# Patient Record
Sex: FEMALE | Race: BLACK OR AFRICAN AMERICAN | NOT HISPANIC OR LATINO | URBAN - NONMETROPOLITAN AREA
[De-identification: names, ages, dates, MRNs, and addresses within clinical notes are randomized per-mention and may not be internally consistent; named-entity substitution may affect disease eponyms.]

---

## 2021-06-22 ENCOUNTER — APPOINTMENT (OUTPATIENT)
Dept: URBAN - NONMETROPOLITAN AREA CLINIC 45 | Age: 61
Setting detail: DERMATOLOGY
End: 2021-06-23

## 2021-06-22 DIAGNOSIS — L259 CONTACT DERMATITIS AND OTHER ECZEMA, UNSPECIFIED CAUSE: ICD-10-CM

## 2021-06-22 DIAGNOSIS — L30.4 ERYTHEMA INTERTRIGO: ICD-10-CM

## 2021-06-22 PROBLEM — L30.8 OTHER SPECIFIED DERMATITIS: Status: ACTIVE | Noted: 2021-06-22

## 2021-06-22 PROCEDURE — OTHER PRESCRIPTION: OTHER

## 2021-06-22 PROCEDURE — OTHER TREATMENT REGIMEN: OTHER

## 2021-06-22 PROCEDURE — OTHER COUNSELING: OTHER

## 2021-06-22 PROCEDURE — 99203 OFFICE O/P NEW LOW 30 MIN: CPT

## 2021-06-22 PROCEDURE — OTHER MEDICATION COUNSELING: OTHER

## 2021-06-22 RX ORDER — HYDROCORTISONE 25 MG/G
APPLY OINTMENT TOPICAL PRN
Qty: 1 | Refills: 2 | Status: ERX | COMMUNITY
Start: 2021-06-22

## 2021-06-22 RX ORDER — FLUCONAZOLE 150 MG/1
1 TABLET ORAL ONCE A DAY
Qty: 3 | Refills: 0 | Status: ERX | COMMUNITY
Start: 2021-06-22

## 2021-06-22 RX ORDER — CICLOPIROX OLAMINE 7.7 MG/G
APPLY CREAM TOPICAL AS DIRECTED
Qty: 1 | Refills: 5 | Status: ERX | COMMUNITY
Start: 2021-06-22

## 2021-06-22 ASSESSMENT — LOCATION ZONE DERM: LOCATION ZONE: VULVA

## 2021-06-22 ASSESSMENT — LOCATION DETAILED DESCRIPTION DERM
LOCATION DETAILED: RIGHT LABIUM MAJUS
LOCATION DETAILED: LEFT LABIUM MAJUS

## 2021-06-22 ASSESSMENT — LOCATION SIMPLE DESCRIPTION DERM: LOCATION SIMPLE: LABIA MAJORA

## 2021-06-22 NOTE — PROCEDURE: MEDICATION COUNSELING
Xelbethanyz Pregnancy And Lactation Text: This medication is Pregnancy Category D and is not considered safe during pregnancy.  The risk during breast feeding is also uncertain.

## 2021-06-22 NOTE — PROCEDURE: TREATMENT REGIMEN
Plan: Ciclopirox Cream twice a day x3 weeks \\nHydrocortisone 2.5% Ointment x2 weeks \\nFluconazole 150 mg 1 pill once a day x 3 days\\nD/C all fragrances and dyes to the area \\nAvoid scented wipes \\nChange to All Free And Clear detergent
Detail Level: Zone
Plan: Avoid all fragrances and dyes, especially scented or any other type of wipes.\\nCan cleanse with water or use cool compresses to area. Wash all clothing in ALL FREE and clear detergent.\\nLikely contact dermatitis component of inflammatory process in groin.\\nApply topical hydrocortisone 1-2 x daily for up to 2 weeks.

## 2021-06-22 NOTE — PROCEDURE: MEDICATION COUNSELING
Yrn from [facility] is calling, he would like to speak with someone in regards to changing the pt's medication norethindrone-ethinyl estradiol-iron (BLISOVI FE 1.5/30) 1.5-30 MG-MCG tablet. Yrn states that this medication this is on back order due to the iron. He would like to know if he can change this prescription and leave out the iron. Yrn can be reached at [phone].    Ilumya Counseling: I discussed with the patient the risks of tildrakizumab including but not limited to immunosuppression, malignancy, posterior leukoencephalopathy syndrome, and serious infections.  The patient understands that monitoring is required including a PPD at baseline and must alert us or the primary physician if symptoms of infection or other concerning signs are noted.

## 2022-01-12 ENCOUNTER — RX ONLY (RX ONLY)
Age: 62
End: 2022-01-12

## 2022-01-12 RX ORDER — CICLOPIROX OLAMINE 7.7 MG/G
APPLY CREAM TOPICAL TWICE A DAY
Qty: 90 | Refills: 0 | Status: ERX | COMMUNITY
Start: 2022-01-12

## 2022-01-12 RX ORDER — HYDROCORTISONE 25 MG/G
APPLY OINTMENT TOPICAL TWICE A DAY
Qty: 28.35 | Refills: 0 | Status: ERX | COMMUNITY
Start: 2022-01-12

## 2022-01-27 NOTE — PROCEDURE: MEDICATION COUNSELING
Care Everywhere: updated  Immunization: updated  Health Maintenance: updated  Media Review: review for outside mammogram report   Legacy Review:   DIS: no mammogram on file   Order placed: hemoglobin   Upcoming appts:  EFAX:  Task Tickets:Mammogram scheduling ticket sent to patient's portal on 12.15.2021  Referrals:       Niacinamide Counseling: I recommended taking niacin or niacinamide, also know as vitamin B3, twice daily. Recent evidence suggests that taking vitamin B3 (500 mg twice daily) can reduce the risk of actinic keratoses and non-melanoma skin cancers. Side effects of vitamin B3 include flushing and headache.

## 2022-02-22 ENCOUNTER — APPOINTMENT (OUTPATIENT)
Dept: URBAN - NONMETROPOLITAN AREA CLINIC 45 | Age: 62
Setting detail: DERMATOLOGY
End: 2022-02-24

## 2022-02-22 DIAGNOSIS — L30.4 ERYTHEMA INTERTRIGO: ICD-10-CM

## 2022-02-22 DIAGNOSIS — L259 CONTACT DERMATITIS AND OTHER ECZEMA, UNSPECIFIED CAUSE: ICD-10-CM

## 2022-02-22 PROBLEM — L30.8 OTHER SPECIFIED DERMATITIS: Status: ACTIVE | Noted: 2022-02-22

## 2022-02-22 PROCEDURE — 99213 OFFICE O/P EST LOW 20 MIN: CPT

## 2022-02-22 PROCEDURE — OTHER COUNSELING: OTHER

## 2022-02-22 PROCEDURE — OTHER MIPS QUALITY: OTHER

## 2022-02-22 PROCEDURE — OTHER TREATMENT REGIMEN: OTHER

## 2022-02-22 PROCEDURE — OTHER PRESCRIPTION: OTHER

## 2022-02-22 PROCEDURE — OTHER MEDICATION COUNSELING: OTHER

## 2022-02-22 RX ORDER — HYDROCORTISONE 25 MG/G
APPLY OINTMENT TOPICAL PRN
Qty: 28.35 | Refills: 2 | Status: ERX

## 2022-02-22 RX ORDER — CICLOPIROX OLAMINE 7.7 MG/G
APPLY CREAM TOPICAL AS DIRECTED
Qty: 90 | Refills: 3 | Status: ERX

## 2022-02-22 ASSESSMENT — LOCATION SIMPLE DESCRIPTION DERM: LOCATION SIMPLE: LABIA MAJORA

## 2022-02-22 ASSESSMENT — LOCATION ZONE DERM: LOCATION ZONE: VULVA

## 2022-02-22 ASSESSMENT — LOCATION DETAILED DESCRIPTION DERM
LOCATION DETAILED: RIGHT LABIUM MAJUS
LOCATION DETAILED: LEFT LABIUM MAJUS

## 2022-02-22 NOTE — PROCEDURE: MIPS QUALITY
Quality 110: Preventive Care And Screening: Influenza Immunization: Influenza Immunization Ordered or Recommended, but not Administered due to system reason
Additional Notes: Patient advised to see local pharmacy for flu vaccination.
Detail Level: Detailed

## 2022-02-22 NOTE — PROCEDURE: TREATMENT REGIMEN
Detail Level: Zone
Plan: Ciclopirox Cream twice a day x3 weeks \\nHydrocortisone 2.5% Ointment x1 week cycles\\nD/C all fragrances and dyes to the area \\nAvoid scented wipes \\Alonzo Free And Clear detergent\\nStart back using Dove Fragrance Free Bar Soap
Plan: Avoid all fragrances and dyes, especially scented or any other type of wipes.\\nCan cleanse with water or use cool compresses to area. Wash all clothing in ALL FREE and clear detergent.\\nLikely contact dermatitis component of inflammatory process in groin.\\nApply topical hydrocortisone 1-2 x daily for up to 2 weeks.

## 2023-07-03 ENCOUNTER — RX ONLY (RX ONLY)
Age: 63
End: 2023-07-03

## 2023-07-03 RX ORDER — HYDROCORTISONE 25 MG/G
APPLY OINTMENT TOPICAL PRN
Qty: 28.35 | Refills: 2 | Status: ERX | COMMUNITY
Start: 2023-07-03

## 2023-11-09 ENCOUNTER — RX ONLY (RX ONLY)
Age: 63
End: 2023-11-09

## 2023-11-09 RX ORDER — HYDROCORTISONE 25 MG/G
APPLY OINTMENT TOPICAL PRN
Qty: 28.35 | Refills: 2 | Status: ERX

## 2023-11-09 RX ORDER — CICLOPIROX OLAMINE 7.7 MG/G
APPLY CREAM TOPICAL AS DIRECTED
Qty: 90 | Refills: 3 | Status: ERX | COMMUNITY
Start: 2023-11-09

## 2025-06-16 NOTE — PROCEDURE: MEDICATION COUNSELING
15 Terbinafine Counseling: Patient counseling regarding adverse effects of terbinafine including but not limited to headache, diarrhea, rash, upset stomach, liver function test abnormalities, itching, taste/smell disturbance, nausea, abdominal pain, and flatulence.  There is a rare possibility of liver failure that can occur when taking terbinafine.  The patient understands that a baseline LFT and kidney function test may be required. The patient verbalized understanding of the proper use and possible adverse effects of terbinafine.  All of the patient's questions and concerns were addressed.